# Patient Record
Sex: MALE | Race: WHITE | NOT HISPANIC OR LATINO | URBAN - METROPOLITAN AREA
[De-identification: names, ages, dates, MRNs, and addresses within clinical notes are randomized per-mention and may not be internally consistent; named-entity substitution may affect disease eponyms.]

---

## 2021-05-23 ENCOUNTER — INPATIENT HOSPITAL (OUTPATIENT)
Dept: URBAN - METROPOLITAN AREA HOSPITAL 81 | Facility: HOSPITAL | Age: 66
End: 2021-05-23

## 2021-05-23 DIAGNOSIS — K74.60 UNSPECIFIED CIRRHOSIS OF LIVER: ICD-10-CM

## 2021-05-23 DIAGNOSIS — K80.20 CALCULUS OF GALLBLADDER WITHOUT CHOLECYSTITIS WITHOUT OBSTRU: ICD-10-CM

## 2021-05-23 DIAGNOSIS — R10.11 RIGHT UPPER QUADRANT PAIN: ICD-10-CM

## 2021-05-23 PROCEDURE — 99223 1ST HOSP IP/OBS HIGH 75: CPT | Performed by: INTERNAL MEDICINE

## 2021-05-24 ENCOUNTER — INPATIENT HOSPITAL (OUTPATIENT)
Dept: URBAN - METROPOLITAN AREA HOSPITAL 81 | Facility: HOSPITAL | Age: 66
End: 2021-05-24

## 2021-05-24 DIAGNOSIS — K80.20 CALCULUS OF GALLBLADDER WITHOUT CHOLECYSTITIS WITHOUT OBSTRU: ICD-10-CM

## 2021-05-24 DIAGNOSIS — K81.0 ACUTE CHOLECYSTITIS: ICD-10-CM

## 2021-05-24 DIAGNOSIS — K74.60 UNSPECIFIED CIRRHOSIS OF LIVER: ICD-10-CM

## 2021-05-24 DIAGNOSIS — R10.11 RIGHT UPPER QUADRANT PAIN: ICD-10-CM

## 2021-05-24 PROCEDURE — 99232 SBSQ HOSP IP/OBS MODERATE 35: CPT

## 2021-10-29 ENCOUNTER — OFFICE (OUTPATIENT)
Dept: URBAN - METROPOLITAN AREA CLINIC 14 | Facility: CLINIC | Age: 66
End: 2021-10-29

## 2021-10-29 VITALS
DIASTOLIC BLOOD PRESSURE: 73 MMHG | HEIGHT: 71 IN | RESPIRATION RATE: 18 BRPM | WEIGHT: 185 LBS | OXYGEN SATURATION: 95 % | SYSTOLIC BLOOD PRESSURE: 121 MMHG | HEART RATE: 66 BPM

## 2021-10-29 DIAGNOSIS — K52.89 OTHER SPECIFIED NONINFECTIVE GASTROENTERITIS AND COLITIS: ICD-10-CM

## 2021-10-29 DIAGNOSIS — Z79.02 LONG TERM (CURRENT) USE OF ANTITHROMBOTICS/ANTIPLATELETS: ICD-10-CM

## 2021-10-29 DIAGNOSIS — B18.2 CHRONIC VIRAL HEPATITIS C: ICD-10-CM

## 2021-10-29 DIAGNOSIS — K70.31 ALCOHOLIC CIRRHOSIS OF LIVER WITH ASCITES: ICD-10-CM

## 2021-10-29 DIAGNOSIS — I25.10 ATHEROSCLEROTIC HEART DISEASE OF NATIVE CORONARY ARTERY WITH: ICD-10-CM

## 2021-10-29 DIAGNOSIS — K72.90 HEPATIC FAILURE, UNSPECIFIED WITHOUT COMA: ICD-10-CM

## 2021-10-29 LAB
CBC, PLATELET, NO DIFFERENTIAL: HEMATOCRIT: 33.2 % — LOW (ref 37.5–51)
CBC, PLATELET, NO DIFFERENTIAL: HEMOGLOBIN: 12 G/DL — LOW (ref 13–17.7)
CBC, PLATELET, NO DIFFERENTIAL: MCH: 34.2 PG — HIGH (ref 26.6–33)
CBC, PLATELET, NO DIFFERENTIAL: MCHC: 36.1 G/DL — HIGH (ref 31.5–35.7)
CBC, PLATELET, NO DIFFERENTIAL: MCV: 95 FL (ref 79–97)
CBC, PLATELET, NO DIFFERENTIAL: PLATELETS: 116 X10E3/UL — LOW (ref 150–450)
CBC, PLATELET, NO DIFFERENTIAL: RBC: 3.51 X10E6/UL — LOW (ref 4.14–5.8)
CBC, PLATELET, NO DIFFERENTIAL: RDW: 16.1 % — HIGH (ref 11.6–15.4)
CBC, PLATELET, NO DIFFERENTIAL: WBC: 8.6 X10E3/UL (ref 3.4–10.8)
COMP. METABOLIC PANEL (14): A/G RATIO: 1.5 (ref 1.2–2.2)
COMP. METABOLIC PANEL (14): ALBUMIN: 3.3 G/DL — LOW (ref 3.8–4.8)
COMP. METABOLIC PANEL (14): ALKALINE PHOSPHATASE: 207 IU/L — HIGH (ref 44–121)
COMP. METABOLIC PANEL (14): ALT (SGPT): 35 IU/L (ref 0–44)
COMP. METABOLIC PANEL (14): AST (SGOT): 84 IU/L — HIGH (ref 0–40)
COMP. METABOLIC PANEL (14): BILIRUBIN, TOTAL: 1.3 MG/DL — HIGH (ref 0–1.2)
COMP. METABOLIC PANEL (14): BUN/CREATININE RATIO: 8 — LOW (ref 10–24)
COMP. METABOLIC PANEL (14): BUN: 6 MG/DL — LOW (ref 8–27)
COMP. METABOLIC PANEL (14): CALCIUM: 8.8 MG/DL (ref 8.6–10.2)
COMP. METABOLIC PANEL (14): CARBON DIOXIDE, TOTAL: 21 MMOL/L (ref 20–29)
COMP. METABOLIC PANEL (14): CHLORIDE: 109 MMOL/L — HIGH (ref 96–106)
COMP. METABOLIC PANEL (14): CREATININE: 0.8 MG/DL (ref 0.76–1.27)
COMP. METABOLIC PANEL (14): EGFR IF AFRICN AM: 108 ML/MIN/1.73 (ref 59–?)
COMP. METABOLIC PANEL (14): EGFR IF NONAFRICN AM: 94 ML/MIN/1.73 (ref 59–?)
COMP. METABOLIC PANEL (14): GLOBULIN, TOTAL: 2.2 G/DL (ref 1.5–4.5)
COMP. METABOLIC PANEL (14): GLUCOSE: 84 MG/DL (ref 65–99)
COMP. METABOLIC PANEL (14): POTASSIUM: 4.1 MMOL/L (ref 3.5–5.2)
COMP. METABOLIC PANEL (14): PROTEIN, TOTAL: 5.5 G/DL — LOW (ref 6–8.5)
COMP. METABOLIC PANEL (14): SODIUM: 140 MMOL/L (ref 134–144)
HCV RNA BY PCR, QN RFX GENO: HCV GENOTYPE: (no result)
HCV RNA BY PCR, QN RFX GENO: HCV LOG10: 6.25 LOG10 IU/ML
HCV RNA BY PCR, QN RFX GENO: HEPATITIS C QUANTITATION: (no result) IU/ML
HCV RNA BY PCR, QN RFX GENO: TEST INFORMATION: (no result)
HEPATITIS C GENOTYPE: (no result)
HEPATITIS C GENOTYPE: PLEASE NOTE: (no result)
PROTHROMBIN TIME (PT): INR: 1.2 (ref 0.9–1.2)
PROTHROMBIN TIME (PT): PROTHROMBIN TIME: 12.1 SEC — HIGH (ref 9.1–12)

## 2021-10-29 PROCEDURE — 99205 OFFICE O/P NEW HI 60 MIN: CPT | Performed by: INTERNAL MEDICINE

## 2021-10-29 RX ORDER — POLYETHYLENE GLYCOL 3350, SODIUM SULFATE, SODIUM CHLORIDE, POTASSIUM CHLORIDE, ASCORBIC ACID, SODIUM ASCORBATE 140-9-5.2G
KIT ORAL
Qty: 1 | Refills: 0 | Status: COMPLETED
Start: 2021-10-29 | End: 2022-01-28

## 2021-10-29 RX ORDER — RIFAXIMIN 550 MG/1
1100 TABLET ORAL
Qty: 60 | Refills: 11 | Status: ACTIVE
Start: 2021-10-29

## 2021-10-29 NOTE — SERVICEHPINOTES
Mr. Thomas is a 65-year-old  male who presented to [facility] in late May 2021 with abdominal pain, nausea, vomiting. He underwent a CT scan which revealed changes of cirrhosis with small amount of ascites and splenomegaly. There was gallstones with pericholecystic fluid, but ultrasound did not mention stones. He subsequently had a normal HIDA scan.He has a few issues to address today. He is having approximately 10 loose stools per day with some nocturnal diarrhea as well. This started in May about the time he was started on lactulose. He is taking some occasional Imodium. He stop lactulose in late July. He had clindamycin for a tooth abscess approximately a month ago. He has tried fiber supplementation which has helped somewhat. He has never had a colonoscopy.Regarding his cirrhosis, he tells me that he drink quite heavily from age [age]. He preferred [place], but with drink beer and wine. He has been sober since approximately age 46. He had a recent CT scan a little over week ago showing moderate volume ascites. He has never had a paracentesis. He describes feeling confused and feel like he can not remember anything. Again he was started on lactulose, but could not tolerate due to diarrhea. He was started on furosemide 80 mg and spironolactone 50 mg a few months back. He ran out a few days ago. I reviewed his labs from August and at that time his INR was 1.3, total bilirubin 1.6, creatinine 0.94 and sodium 139 consistent with a meld sodium score of 11. He had hepatitis C antibody positive and hepatitis C viral load of 4, 666, 178. He has never been treated for hepatitis-C before. He had a heart attack approximately 5 years ago and had 4 stents placed by Dr. Jayden Reaves. He has remained on aspirin Plavix since that time. He has not seen Dr. Reaves in 5 years.

## 2022-01-28 ENCOUNTER — OFFICE (OUTPATIENT)
Dept: URBAN - METROPOLITAN AREA CLINIC 14 | Facility: CLINIC | Age: 67
End: 2022-01-28
Payer: MEDICAID

## 2022-01-28 VITALS
DIASTOLIC BLOOD PRESSURE: 82 MMHG | WEIGHT: 163 LBS | RESPIRATION RATE: 18 BRPM | SYSTOLIC BLOOD PRESSURE: 146 MMHG | OXYGEN SATURATION: 83 % | HEIGHT: 71 IN | HEART RATE: 83 BPM

## 2022-01-28 DIAGNOSIS — A04.72 ENTEROCOLITIS DUE TO CLOSTRIDIUM DIFFICILE, NOT SPECIFIED AS: ICD-10-CM

## 2022-01-28 DIAGNOSIS — K70.31 ALCOHOLIC CIRRHOSIS OF LIVER WITH ASCITES: ICD-10-CM

## 2022-01-28 DIAGNOSIS — B18.2 CHRONIC VIRAL HEPATITIS C: ICD-10-CM

## 2022-01-28 PROCEDURE — 99214 OFFICE O/P EST MOD 30 MIN: CPT | Performed by: INTERNAL MEDICINE

## 2022-01-28 RX ORDER — SPIRONOLACTONE 100 MG/1
100 TABLET, FILM COATED ORAL
Qty: 30 | Refills: 2 | Status: ACTIVE
Start: 2022-01-28

## 2022-01-28 RX ORDER — FUROSEMIDE 80 MG/1
80 TABLET ORAL
Qty: 30 | Refills: 3 | Status: ACTIVE

## 2022-01-28 RX ORDER — POLYETHYLENE GLYCOL 3350, SODIUM SULFATE ANHYDROUS, SODIUM BICARBONATE, SODIUM CHLORIDE, POTASSIUM CHLORIDE 236; 22.74; 6.74; 5.86; 2.97 G/4L; G/4L; G/4L; G/4L; G/4L
POWDER, FOR SOLUTION ORAL
Qty: 4000 | Refills: 0 | Status: ACTIVE
Start: 2022-01-28